# Patient Record
Sex: FEMALE | Race: WHITE | NOT HISPANIC OR LATINO | Employment: OTHER | URBAN - METROPOLITAN AREA
[De-identification: names, ages, dates, MRNs, and addresses within clinical notes are randomized per-mention and may not be internally consistent; named-entity substitution may affect disease eponyms.]

---

## 2023-07-10 ENCOUNTER — OFFICE VISIT (OUTPATIENT)
Dept: PRIMARY CARE | Facility: CLINIC | Age: 73
End: 2023-07-10
Payer: MEDICARE

## 2023-07-10 VITALS
HEIGHT: 64 IN | SYSTOLIC BLOOD PRESSURE: 110 MMHG | WEIGHT: 150 LBS | RESPIRATION RATE: 12 BRPM | HEART RATE: 72 BPM | BODY MASS INDEX: 25.61 KG/M2 | DIASTOLIC BLOOD PRESSURE: 66 MMHG

## 2023-07-10 DIAGNOSIS — Z00.00 ROUTINE MEDICAL EXAM: ICD-10-CM

## 2023-07-10 DIAGNOSIS — M62.830 SPASM OF MUSCLE OF LOWER BACK: Primary | ICD-10-CM

## 2023-07-10 PROCEDURE — 1170F FXNL STATUS ASSESSED: CPT | Performed by: FAMILY MEDICINE

## 2023-07-10 PROCEDURE — 1159F MED LIST DOCD IN RCRD: CPT | Performed by: FAMILY MEDICINE

## 2023-07-10 PROCEDURE — 1036F TOBACCO NON-USER: CPT | Performed by: FAMILY MEDICINE

## 2023-07-10 PROCEDURE — 1160F RVW MEDS BY RX/DR IN RCRD: CPT | Performed by: FAMILY MEDICINE

## 2023-07-10 PROCEDURE — 99213 OFFICE O/P EST LOW 20 MIN: CPT | Performed by: FAMILY MEDICINE

## 2023-07-10 RX ORDER — CYCLOBENZAPRINE HCL 10 MG
10 TABLET ORAL NIGHTLY PRN
Qty: 30 TABLET | Refills: 0 | Status: SHIPPED | OUTPATIENT
Start: 2023-07-10 | End: 2023-07-28

## 2023-07-10 ASSESSMENT — ACTIVITIES OF DAILY LIVING (ADL)
DOING_HOUSEWORK: INDEPENDENT
MANAGING_FINANCES: INDEPENDENT
TAKING_MEDICATION: INDEPENDENT
DRESSING: INDEPENDENT
GROCERY_SHOPPING: INDEPENDENT
BATHING: INDEPENDENT

## 2023-07-10 ASSESSMENT — PATIENT HEALTH QUESTIONNAIRE - PHQ9
2. FEELING DOWN, DEPRESSED OR HOPELESS: NOT AT ALL
SUM OF ALL RESPONSES TO PHQ9 QUESTIONS 1 AND 2: 0
1. LITTLE INTEREST OR PLEASURE IN DOING THINGS: NOT AT ALL

## 2023-07-10 NOTE — PROGRESS NOTES
"Subjective   Patient ID: Kaitlyn Eddy is a 73 y.o. female who presents for Medicare Annual Wellness Visit Subsequent (Left flank pain for 4 days).    HPI   pt noticed low flank tenderness for 4 days. No prior injury, no dysuria, urinary frequency and urgency, hematuria, flank pain, fever or chills. No nausea, vomiting. No cp, sob, HA. Normal appetite. The pain was localized to left flank. Symptoms persisted today    Review of Systems    Objective   /66   Pulse 72   Resp 12   Ht 1.626 m (5' 4\")   Wt 68 kg (150 lb)   BMI 25.75 kg/m²     Physical Exam  NAD, well groomed, No sclera icterus. Moist mouth mucosa,  lungs: CTA b/l, heart: RRR, abd: soft, there was  no suprapubic tenderness, no rebound or guarding, BS+, no CVA percussion tenderness. Mild left low back muscle spasm, no local SWELLING OR RASHES OR ERYTHEMA, ,  good balance. A&Ox3, good judgment. No depressed mood    Assessment/Plan   Problem List Items Addressed This Visit       Spasm of muscle of lower back - Primary     Flexeril as dir. Cold compress. Check UA. Call office if symptoms do not resolve in 3 days           Relevant Medications    cyclobenzaprine (Flexeril) 10 mg tablet    Other Relevant Orders    Urinalysis with Reflex Microscopic    Routine medical exam    Relevant Orders    Lipid Panel    Comprehensive Metabolic Panel     Medicare wellness visit: pt was capable of performing all his ADLs and IADLs. Pt has good memory and cognitive function.  Recommend healthy diet and regular exercise. Advise eye exam by an OD yearly for glaucoma screen and dental exam every 6 months. check lipids.   will monitor blood pressure, cholesterol levels and weight regularly. Recommend sunscreen application if exposed to the sun when the UV index is over 2. Recommend shingle vaccines, hep B vaccine, tetanus vaccine, pcv20 and covid shot.   recommend cologuard or Colonoscopy, DEXA, mammogram, HIV, hep C tests.  pt declined    Recommend pt to clear " throw rugs at home and keep good lighting at night to avoid falls. Keep hot water for shower below 120F to avoid burn injury.  recommend grab bar at bathtub.   recommend to update living will and DPOA

## 2023-07-11 ENCOUNTER — TELEPHONE (OUTPATIENT)
Dept: PRIMARY CARE | Facility: CLINIC | Age: 73
End: 2023-07-11

## 2023-07-11 ENCOUNTER — LAB (OUTPATIENT)
Dept: LAB | Facility: LAB | Age: 73
End: 2023-07-11
Payer: MEDICARE

## 2023-07-11 DIAGNOSIS — Z00.00 ROUTINE MEDICAL EXAM: ICD-10-CM

## 2023-07-11 DIAGNOSIS — M62.830 SPASM OF MUSCLE OF LOWER BACK: ICD-10-CM

## 2023-07-11 DIAGNOSIS — M62.830 SPASM OF MUSCLE OF LOWER BACK: Primary | ICD-10-CM

## 2023-07-11 LAB
APPEARANCE, URINE: CLEAR
BILIRUBIN, URINE: NEGATIVE
BLOOD, URINE: NEGATIVE
COLOR, URINE: NORMAL
GLUCOSE, URINE: NEGATIVE MG/DL
KETONES, URINE: NEGATIVE MG/DL
LEUKOCYTE ESTERASE, URINE: NEGATIVE
NITRITE, URINE: NEGATIVE
PH, URINE: 6 (ref 5–8)
PROTEIN, URINE: NEGATIVE MG/DL
SPECIFIC GRAVITY, URINE: 1.01 (ref 1–1.03)
UROBILINOGEN, URINE: <2 MG/DL (ref 0–1.9)

## 2023-07-11 PROCEDURE — 81003 URINALYSIS AUTO W/O SCOPE: CPT

## 2023-07-11 PROCEDURE — 80053 COMPREHEN METABOLIC PANEL: CPT

## 2023-07-11 PROCEDURE — 36415 COLL VENOUS BLD VENIPUNCTURE: CPT

## 2023-07-11 PROCEDURE — 80061 LIPID PANEL: CPT

## 2023-07-11 RX ORDER — MELOXICAM 15 MG/1
15 TABLET ORAL DAILY
Qty: 30 TABLET | Refills: 0 | Status: SHIPPED | OUTPATIENT
Start: 2023-07-11 | End: 2024-05-29 | Stop reason: ALTCHOICE

## 2023-07-11 NOTE — TELEPHONE ENCOUNTER
Patient was seen yesterday and even after medication given the pain is increasing. She states she cannot lay down as the pain is too intense. Pacing seems to be when she has the most relief. Can you call her at [phone]

## 2023-07-12 LAB
ALANINE AMINOTRANSFERASE (SGPT) (U/L) IN SER/PLAS: 46 U/L (ref 7–45)
ALBUMIN (G/DL) IN SER/PLAS: 4.4 G/DL (ref 3.4–5)
ALKALINE PHOSPHATASE (U/L) IN SER/PLAS: 118 U/L (ref 33–136)
ANION GAP IN SER/PLAS: 12 MMOL/L (ref 10–20)
ASPARTATE AMINOTRANSFERASE (SGOT) (U/L) IN SER/PLAS: 27 U/L (ref 9–39)
BILIRUBIN TOTAL (MG/DL) IN SER/PLAS: 0.5 MG/DL (ref 0–1.2)
CALCIUM (MG/DL) IN SER/PLAS: 9.8 MG/DL (ref 8.6–10.6)
CARBON DIOXIDE, TOTAL (MMOL/L) IN SER/PLAS: 31 MMOL/L (ref 21–32)
CHLORIDE (MMOL/L) IN SER/PLAS: 103 MMOL/L (ref 98–107)
CHOLESTEROL (MG/DL) IN SER/PLAS: 200 MG/DL (ref 0–199)
CHOLESTEROL IN HDL (MG/DL) IN SER/PLAS: 59.5 MG/DL
CHOLESTEROL/HDL RATIO: 3.4
CREATININE (MG/DL) IN SER/PLAS: 0.74 MG/DL (ref 0.5–1.05)
GFR FEMALE: 86 ML/MIN/1.73M2
GLUCOSE (MG/DL) IN SER/PLAS: 113 MG/DL (ref 74–99)
LDL: 130 MG/DL (ref 0–99)
POTASSIUM (MMOL/L) IN SER/PLAS: 4.6 MMOL/L (ref 3.5–5.3)
PROTEIN TOTAL: 7.2 G/DL (ref 6.4–8.2)
SODIUM (MMOL/L) IN SER/PLAS: 141 MMOL/L (ref 136–145)
TRIGLYCERIDE (MG/DL) IN SER/PLAS: 52 MG/DL (ref 0–149)
UREA NITROGEN (MG/DL) IN SER/PLAS: 15 MG/DL (ref 6–23)
VLDL: 10 MG/DL (ref 0–40)

## 2023-07-14 ENCOUNTER — TELEPHONE (OUTPATIENT)
Dept: PRIMARY CARE | Facility: CLINIC | Age: 73
End: 2023-07-14
Payer: MEDICARE

## 2023-07-14 NOTE — TELEPHONE ENCOUNTER
I called her to put her on the schedule today and she stated that maybe she should wait and start taking the sterids first and I put her on for 7/18 at 2:45 pm and she will call to let us know for sure is she is coming in and do you want her to a lipid panel if so please put in order

## 2023-07-14 NOTE — TELEPHONE ENCOUNTER
Patient was seen in the ER past two days and they called in steroids for her. They have done Ct with and without contrast and have found nothing. They told her to follow up with you today. Can we fit her in before you leave? Thank you

## 2023-07-18 ENCOUNTER — APPOINTMENT (OUTPATIENT)
Dept: PRIMARY CARE | Facility: CLINIC | Age: 73
End: 2023-07-18
Payer: MEDICARE

## 2023-07-21 ENCOUNTER — OFFICE VISIT (OUTPATIENT)
Dept: PRIMARY CARE | Facility: CLINIC | Age: 73
End: 2023-07-21
Payer: MEDICARE

## 2023-07-21 VITALS — DIASTOLIC BLOOD PRESSURE: 68 MMHG | SYSTOLIC BLOOD PRESSURE: 110 MMHG

## 2023-07-21 DIAGNOSIS — M79.18 MYOFASCIAL PAIN: Primary | ICD-10-CM

## 2023-07-21 PROCEDURE — 1160F RVW MEDS BY RX/DR IN RCRD: CPT | Performed by: FAMILY MEDICINE

## 2023-07-21 PROCEDURE — 1036F TOBACCO NON-USER: CPT | Performed by: FAMILY MEDICINE

## 2023-07-21 PROCEDURE — 99213 OFFICE O/P EST LOW 20 MIN: CPT | Performed by: FAMILY MEDICINE

## 2023-07-21 PROCEDURE — 1159F MED LIST DOCD IN RCRD: CPT | Performed by: FAMILY MEDICINE

## 2023-07-21 PROCEDURE — 20553 NJX 1/MLT TRIGGER POINTS 3/>: CPT | Performed by: FAMILY MEDICINE

## 2023-07-21 NOTE — PROGRESS NOTES
Subjective   Patient ID: Kaitlyn Eddy is a 73 y.o. female who presents for back pain    HPI   Patient noticed persistent  muscle spasms at low back. turning trunk from side to side made the pain worse. Flexeril and mobic and stretch exercise did not help. no recent heavy lifting or falls. pt controlled BM and bladder well. no ext weakness or numbness.    Review of Systems    Objective   /68     Physical Exam  Musculoskeletal:        Arms:    No distress, well groomed, eyes: Normal pupil size,  neck is supple, lungs: CTA b/l, no rales, heart: RRR,  abd: soft, no tenderness, BS+, moderate localized  tenderness at paraspinal muscles as marked in the picture, no local swelling or erythema. turning trunk from side to side aggravated the pain. normal strength and sensation at ext. normal gait and station. Good mood.     Assessment/Plan     Myofascial pain at  low  back, recommend trigger point injection of the area of pain. pt understood potential worse pain, bleeding and infection. pt agrees to have  the injection. Under sterile condition, the areas of pain as marked in the picture was each  injected with 1.5cc  1% lidocaine (NDC: 3196-8010-91) in a pepper spray fashion.  total volume of lidocaine used was 4.5 ml. pt felt pain relief after the injection. Recommend warm compress. Advise stretch exercise for pain relief.

## 2023-07-28 ENCOUNTER — OFFICE VISIT (OUTPATIENT)
Dept: PRIMARY CARE | Facility: CLINIC | Age: 73
End: 2023-07-28
Payer: MEDICARE

## 2023-07-28 VITALS — HEART RATE: 78 BPM | SYSTOLIC BLOOD PRESSURE: 132 MMHG | DIASTOLIC BLOOD PRESSURE: 77 MMHG

## 2023-07-28 DIAGNOSIS — K59.00 CONSTIPATION, UNSPECIFIED CONSTIPATION TYPE: Primary | ICD-10-CM

## 2023-07-28 DIAGNOSIS — M54.50 CHRONIC LOW BACK PAIN WITHOUT SCIATICA, UNSPECIFIED BACK PAIN LATERALITY: ICD-10-CM

## 2023-07-28 DIAGNOSIS — Z12.11 COLON CANCER SCREENING: ICD-10-CM

## 2023-07-28 DIAGNOSIS — G89.29 CHRONIC LOW BACK PAIN WITHOUT SCIATICA, UNSPECIFIED BACK PAIN LATERALITY: ICD-10-CM

## 2023-07-28 DIAGNOSIS — E87.1 HYPONATREMIA: ICD-10-CM

## 2023-07-28 PROCEDURE — 99214 OFFICE O/P EST MOD 30 MIN: CPT | Performed by: FAMILY MEDICINE

## 2023-07-28 PROCEDURE — 1160F RVW MEDS BY RX/DR IN RCRD: CPT | Performed by: FAMILY MEDICINE

## 2023-07-28 PROCEDURE — 1036F TOBACCO NON-USER: CPT | Performed by: FAMILY MEDICINE

## 2023-07-28 PROCEDURE — 1159F MED LIST DOCD IN RCRD: CPT | Performed by: FAMILY MEDICINE

## 2023-07-28 RX ORDER — DICLOFENAC SODIUM 75 MG/1
75 TABLET, DELAYED RELEASE ORAL 2 TIMES DAILY PRN
Qty: 60 TABLET | Refills: 0 | Status: SHIPPED | OUTPATIENT
Start: 2023-07-28 | End: 2023-09-26

## 2023-07-28 RX ORDER — POLYETHYLENE GLYCOL 3350 17 G/17G
17 POWDER, FOR SOLUTION ORAL DAILY
Qty: 30 PACKET | Refills: 3 | Status: SHIPPED | OUTPATIENT
Start: 2023-07-28 | End: 2024-05-29 | Stop reason: ALTCHOICE

## 2023-07-28 NOTE — ASSESSMENT & PLAN NOTE
Constipation, not controlled. Start MiraLAX.  Increase fluid and fiber such as fruits and vegetables intake. Increase exercise.

## 2023-07-28 NOTE — PROGRESS NOTES
Subjective   Patient ID: Kaitlyn Eddy is a 73 y.o. female who presents for low back pain    HPI   Chronic low back dull pain for over 1 mos persisted. Flexeril did not help. The pain was localized to low back. pt denies LE weakness or numbness. pt controlled BM and bladder well. pt was unable to fall asleep due to the pain. no imbalance or falls. Constipation persisted. No confusion, nausea or vomiting. Pt had  appetite.     Review of Systems    Objective   /77   Pulse 78     Physical Exam  Mild  distress, well groomed, No sclera icterus. normal respiration, lungs: CTA b/l, heart: RRR, no LE  edema, normal pedal pulses, abd: soft, no tenderness, BS+, mild low back tenderness, DTR were normal at the knees, SLR test was negative, normal strength and sensation at low extremities, good balance, No depressed mood.    Assessment/Plan   Problem List Items Addressed This Visit       Constipation - Primary     Constipation, not controlled. Start MiraLAX.  Increase fluid and fiber such as fruits and vegetables intake. Increase exercise.            Relevant Medications    polyethylene glycol (Glycolax, Miralax) 17 gram packet    Chronic low back pain without sciatica     Check lumbar x-rays.  stretch exercise.  start diclofenac  as dir. caution of SE from diclofenac such as kidney damage and stomach bleeding. take diclofenac with foods. call office if symptoms do not improve in 1 week or if pain gets worse.           Relevant Medications    diclofenac (Voltaren) 75 mg EC tablet    Other Relevant Orders    XR lumbar spine complete 4+ views    Hyponatremia     Limit fluid intake to 50 oz a day

## 2023-07-28 NOTE — ASSESSMENT & PLAN NOTE
Check lumbar x-rays.  stretch exercise.  start diclofenac  as dir. caution of SE from diclofenac such as kidney damage and stomach bleeding. take diclofenac with foods. call office if symptoms do not improve in 1 week or if pain gets worse.

## 2023-08-02 ENCOUNTER — TELEPHONE (OUTPATIENT)
Dept: PRIMARY CARE | Facility: CLINIC | Age: 73
End: 2023-08-02
Payer: MEDICARE

## 2023-08-02 DIAGNOSIS — G89.29 CHRONIC LOW BACK PAIN WITHOUT SCIATICA, UNSPECIFIED BACK PAIN LATERALITY: Primary | ICD-10-CM

## 2023-08-02 DIAGNOSIS — M54.50 CHRONIC LOW BACK PAIN WITHOUT SCIATICA, UNSPECIFIED BACK PAIN LATERALITY: Primary | ICD-10-CM

## 2023-08-02 NOTE — TELEPHONE ENCOUNTER
Patient is still unable to sleep at night due to her back pain. She would like a referral to rheumatology. Can we put this in for her? Thank you

## 2023-08-03 LAB
ANTI-SSA: <0.2 AI
ANTI-SSB: <0.2 AI

## 2023-08-08 ENCOUNTER — TELEPHONE (OUTPATIENT)
Dept: PRIMARY CARE | Facility: CLINIC | Age: 73
End: 2023-08-08

## 2023-08-08 ENCOUNTER — OFFICE VISIT (OUTPATIENT)
Dept: PRIMARY CARE | Facility: CLINIC | Age: 73
End: 2023-08-08
Payer: MEDICARE

## 2023-08-08 VITALS — DIASTOLIC BLOOD PRESSURE: 76 MMHG | SYSTOLIC BLOOD PRESSURE: 125 MMHG | RESPIRATION RATE: 14 BRPM | HEART RATE: 68 BPM

## 2023-08-08 DIAGNOSIS — G51.0 BELL'S PALSY: Primary | ICD-10-CM

## 2023-08-08 DIAGNOSIS — G89.29 CHRONIC MIDLINE LOW BACK PAIN WITHOUT SCIATICA: ICD-10-CM

## 2023-08-08 DIAGNOSIS — M54.50 CHRONIC MIDLINE LOW BACK PAIN WITHOUT SCIATICA: ICD-10-CM

## 2023-08-08 DIAGNOSIS — M35.00 SICCA SYNDROME (MULTI): ICD-10-CM

## 2023-08-08 PROCEDURE — 1160F RVW MEDS BY RX/DR IN RCRD: CPT | Performed by: FAMILY MEDICINE

## 2023-08-08 PROCEDURE — 99214 OFFICE O/P EST MOD 30 MIN: CPT | Performed by: FAMILY MEDICINE

## 2023-08-08 PROCEDURE — 1036F TOBACCO NON-USER: CPT | Performed by: FAMILY MEDICINE

## 2023-08-08 PROCEDURE — 1159F MED LIST DOCD IN RCRD: CPT | Performed by: FAMILY MEDICINE

## 2023-08-08 RX ORDER — GABAPENTIN 300 MG/1
300 CAPSULE ORAL NIGHTLY
Qty: 30 CAPSULE | Refills: 0 | Status: SHIPPED | OUTPATIENT
Start: 2023-08-08 | End: 2024-05-29 | Stop reason: ALTCHOICE

## 2023-08-08 RX ORDER — PREDNISONE 20 MG/1
TABLET ORAL
Qty: 23 TABLET | Refills: 0 | Status: SHIPPED | OUTPATIENT
Start: 2023-08-08 | End: 2024-05-29 | Stop reason: ALTCHOICE

## 2023-08-08 RX ORDER — VALACYCLOVIR HYDROCHLORIDE 1 G/1
1000 TABLET, FILM COATED ORAL 3 TIMES DAILY
Qty: 21 TABLET | Refills: 0 | Status: SHIPPED | OUTPATIENT
Start: 2023-08-08 | End: 2023-08-15

## 2023-08-09 NOTE — ASSESSMENT & PLAN NOTE
Prednisone and valtrex as dir. Tape the the affected eye if it is not closed while asleep. Pt was concerned that she may have lyme disease. Pt prefers to see an ID for further eval. Call office if symptoms get worse, or if ext. Weakness, incontinence, trouble swallowing, severe HA occurs

## 2023-08-09 NOTE — PROGRESS NOTES
Subjective   Patient ID: Kaitlyn Eddy is a 73 y.o. female who presents for left face paralysis    HPI   Pt noticed sudden weakness and  paralysis on left  side of her face With A drooping eyebrow and mouth and Difficulty closing left eyelid 2 days ago. Pt had slight  eating or drinking. No ext weakness. Stable urination and bm. Pt cont to have dry mouth and dry eyes. Pt had hx of sicca syndrome, pt had blood tests with rheumatologist. low back dull pain persisted  at 4-7/10. pt denies LE weakness or numbness. no imbalance or falls.     Review of Systems    Objective   /76   Pulse 68   Resp 14     Physical Exam  Mild  distress, well groomed, No sclera icterus. PERRLA, ENT were normal. Normal hearing. No cervical or axillary lymphadenopathy. normal respiration, lungs: CTA b/l, heart: RRR, no LE  edema, normal pedal pulses, abd: soft, no tenderness, BS+, mild low back tenderness, DTR were normal at the knees, SLR test was negative, normal strength and sensation at low extremities, good balance, + left upper and lower facial muscle weakness on the face (including the forehead, eyelid, and mouth). Left face drooping.  Good mood  Assessment/Plan   Problem List Items Addressed This Visit       Chronic low back pain without sciatica     Not controlled. Cont mobic and add neurontin. Ortho eval         Relevant Medications    gabapentin (Neurontin) 300 mg capsule    Other Relevant Orders    Referral to Orthopaedic Surgery    Sicca syndrome (CMS/HCC)     Persistent dry mouth and dry eyes. Await blood test results. Fu with rheumatologist         Bell's palsy - Primary     Prednisone and valtrex as dir. Tape the the affected eye if it is not closed while asleep. Pt was concerned that she may have lyme disease. Pt prefers to see an ID for further eval. Call office if symptoms get worse, or if ext. Weakness, incontinence, trouble swallowing, severe HA occurs           Relevant Medications    valACYclovir (Valtrex) 1  gram tablet    predniSONE (Deltasone) 20 mg tablet    Other Relevant Orders    Referral to Infectious Disease

## 2023-08-10 ENCOUNTER — APPOINTMENT (OUTPATIENT)
Dept: LAB | Facility: LAB | Age: 73
End: 2023-08-10
Payer: MEDICARE

## 2023-08-10 DIAGNOSIS — R19.5 POSITIVE COLORECTAL CANCER SCREENING USING COLOGUARD TEST: Primary | ICD-10-CM

## 2023-08-10 LAB
C REACTIVE PROTEIN (MG/L) IN SER/PLAS: 0.14 MG/DL
CYTOMEGALOVIRUS IGG ANTIBODY: REACTIVE
EBV INTERPRETATION: ABNORMAL
EPSTEIN-BARR VCA IGG: POSITIVE
EPSTEIN-BARR VCA IGM: POSITIVE
EPSTEIN-BARR VIRUS EARLY ANTIGEN ANTIBODY, IGG: POSITIVE
EPSTIEN-BARR NUCLEAR ANTIGEN AB: POSITIVE
HERPES SIMPLEX VIRUS 1 IGG: >8 INDEX
HERPES SIMPLEX VIRUS 2 IGG: <0.2 INDEX
NONINV COLON CA DNA+OCC BLD SCRN STL QL: POSITIVE
SEDIMENTATION RATE, ERYTHROCYTE: 34 MM/H (ref 0–30)
SYPHILIS TOTAL AB: NONREACTIVE

## 2023-08-11 LAB
ANA PATTERN: ABNORMAL
ANA TITER: ABNORMAL
ANTI-CENTROMERE: <0.2 AI
ANTI-CHROMATIN: <0.2 AI
ANTI-DNA (DS): <1 IU/ML
ANTI-JO-1 IGG: <0.2 AI
ANTI-NUCLEAR ANTIBODY (ANA): POSITIVE
ANTI-RIBOSOMAL P: <0.2 AI
ANTI-RNP: <0.2 AI
ANTI-SCL-70: <0.2 AI
ANTI-SM/RNP: <0.2 AI
ANTI-SM: <0.2 AI
ANTI-SSA: <0.2 AI
ANTI-SSB: <0.2 AI

## 2023-08-14 LAB
ANCA IFA PATTERN: NORMAL
ANCA IFA TITER: NORMAL
B. BURGDORFERI VLSE1/PEPC10 ABS, ELISA: 5.03 IV
BORRELIA BURGDORFERI AB, IGG BY ELISA: 3.92 IV
BORRELIA BURGDORFERI AB, IGM BY ELISA: 3.96 IV
CYTOMEGALOVIRUS IGM ANTIBODY: 8.9 AU/ML
LYME MODIFIED 2-TIER TESTING INTERPRETATION: POSITIVE
MYELOPEROXIDASE (MPO) AB, IGG: 0 AU/ML (ref 0–19)
ROCKY MOUNTAIN SPOTTED FEVER IGG ANTIBODY: NORMAL
ROCKY MOUNTAIN SPOTTED FEVER IGM ANTIBODY: NORMAL
SERINE PROTEINASE 3 (PR3) AB, IGG: 2 AU/ML (ref 0–19)

## 2023-08-15 ENCOUNTER — TELEPHONE (OUTPATIENT)
Dept: PRIMARY CARE | Facility: CLINIC | Age: 73
End: 2023-08-15
Payer: MEDICARE

## 2023-08-15 NOTE — TELEPHONE ENCOUNTER
Patient would like to talk with you regarding her results that she is looking at in her chart. Thank you

## 2023-10-26 ENCOUNTER — APPOINTMENT (OUTPATIENT)
Dept: INFECTIOUS DISEASES | Facility: CLINIC | Age: 73
End: 2023-10-26
Payer: MEDICARE

## 2024-05-29 ENCOUNTER — LAB (OUTPATIENT)
Dept: LAB | Facility: LAB | Age: 74
End: 2024-05-29
Payer: MEDICARE

## 2024-05-29 ENCOUNTER — OFFICE VISIT (OUTPATIENT)
Dept: PRIMARY CARE | Facility: CLINIC | Age: 74
End: 2024-05-29
Payer: MEDICARE

## 2024-05-29 VITALS
DIASTOLIC BLOOD PRESSURE: 76 MMHG | SYSTOLIC BLOOD PRESSURE: 129 MMHG | RESPIRATION RATE: 15 BRPM | HEART RATE: 68 BPM | BODY MASS INDEX: 24.75 KG/M2 | HEIGHT: 64 IN | WEIGHT: 145 LBS

## 2024-05-29 DIAGNOSIS — Z00.00 ROUTINE MEDICAL EXAM: ICD-10-CM

## 2024-05-29 DIAGNOSIS — Z00.00 ROUTINE MEDICAL EXAM: Primary | ICD-10-CM

## 2024-05-29 DIAGNOSIS — Z78.0 POST-MENOPAUSAL: ICD-10-CM

## 2024-05-29 DIAGNOSIS — Z12.31 ENCOUNTER FOR SCREENING MAMMOGRAM FOR MALIGNANT NEOPLASM OF BREAST: ICD-10-CM

## 2024-05-29 PROBLEM — G89.29 CHRONIC LOW BACK PAIN WITHOUT SCIATICA: Status: RESOLVED | Noted: 2023-07-28 | Resolved: 2024-05-29

## 2024-05-29 PROBLEM — M79.18 MYOFASCIAL PAIN: Status: RESOLVED | Noted: 2023-07-21 | Resolved: 2024-05-29

## 2024-05-29 PROBLEM — M62.830 SPASM OF MUSCLE OF LOWER BACK: Status: RESOLVED | Noted: 2023-07-10 | Resolved: 2024-05-29

## 2024-05-29 PROBLEM — M35.00 SICCA SYNDROME (MULTI): Status: RESOLVED | Noted: 2023-08-08 | Resolved: 2024-05-29

## 2024-05-29 PROBLEM — M54.50 CHRONIC LOW BACK PAIN WITHOUT SCIATICA: Status: RESOLVED | Noted: 2023-07-28 | Resolved: 2024-05-29

## 2024-05-29 PROBLEM — K59.00 CONSTIPATION: Status: RESOLVED | Noted: 2023-07-28 | Resolved: 2024-05-29

## 2024-05-29 PROBLEM — G51.0 BELL'S PALSY: Status: RESOLVED | Noted: 2023-08-08 | Resolved: 2024-05-29

## 2024-05-29 LAB
ALBUMIN SERPL BCP-MCNC: 4.5 G/DL (ref 3.4–5)
ALP SERPL-CCNC: 82 U/L (ref 33–136)
ALT SERPL W P-5'-P-CCNC: 28 U/L (ref 7–45)
ANION GAP SERPL CALC-SCNC: 13 MMOL/L (ref 10–20)
AST SERPL W P-5'-P-CCNC: 21 U/L (ref 9–39)
BILIRUB SERPL-MCNC: 0.6 MG/DL (ref 0–1.2)
BUN SERPL-MCNC: 28 MG/DL (ref 6–23)
CALCIUM SERPL-MCNC: 9.9 MG/DL (ref 8.6–10.3)
CHLORIDE SERPL-SCNC: 104 MMOL/L (ref 98–107)
CHOLEST SERPL-MCNC: 222 MG/DL (ref 0–199)
CHOLESTEROL/HDL RATIO: 3.6
CO2 SERPL-SCNC: 29 MMOL/L (ref 21–32)
CREAT SERPL-MCNC: 0.66 MG/DL (ref 0.5–1.05)
EGFRCR SERPLBLD CKD-EPI 2021: >90 ML/MIN/1.73M*2
ERYTHROCYTE [DISTWIDTH] IN BLOOD BY AUTOMATED COUNT: 13.6 % (ref 11.5–14.5)
GLUCOSE SERPL-MCNC: 102 MG/DL (ref 74–99)
HCT VFR BLD AUTO: 44.1 % (ref 36–46)
HDLC SERPL-MCNC: 61.3 MG/DL
HGB BLD-MCNC: 13.3 G/DL (ref 12–16)
LDLC SERPL CALC-MCNC: 148 MG/DL
MCH RBC QN AUTO: 26.4 PG (ref 26–34)
MCHC RBC AUTO-ENTMCNC: 30.2 G/DL (ref 32–36)
MCV RBC AUTO: 88 FL (ref 80–100)
NON HDL CHOLESTEROL: 161 MG/DL (ref 0–149)
NRBC BLD-RTO: 0 /100 WBCS (ref 0–0)
PLATELET # BLD AUTO: 267 X10*3/UL (ref 150–450)
POTASSIUM SERPL-SCNC: 4.9 MMOL/L (ref 3.5–5.3)
PROT SERPL-MCNC: 7.2 G/DL (ref 6.4–8.2)
RBC # BLD AUTO: 5.03 X10*6/UL (ref 4–5.2)
SODIUM SERPL-SCNC: 141 MMOL/L (ref 136–145)
TRIGL SERPL-MCNC: 66 MG/DL (ref 0–149)
TSH SERPL-ACNC: 1.93 MIU/L (ref 0.44–3.98)
VLDL: 13 MG/DL (ref 0–40)
WBC # BLD AUTO: 6.4 X10*3/UL (ref 4.4–11.3)

## 2024-05-29 PROCEDURE — 1036F TOBACCO NON-USER: CPT | Performed by: FAMILY MEDICINE

## 2024-05-29 PROCEDURE — G0439 PPPS, SUBSEQ VISIT: HCPCS | Performed by: FAMILY MEDICINE

## 2024-05-29 PROCEDURE — 1160F RVW MEDS BY RX/DR IN RCRD: CPT | Performed by: FAMILY MEDICINE

## 2024-05-29 PROCEDURE — 36415 COLL VENOUS BLD VENIPUNCTURE: CPT

## 2024-05-29 PROCEDURE — 1124F ACP DISCUSS-NO DSCNMKR DOCD: CPT | Performed by: FAMILY MEDICINE

## 2024-05-29 PROCEDURE — 1159F MED LIST DOCD IN RCRD: CPT | Performed by: FAMILY MEDICINE

## 2024-05-29 PROCEDURE — 99397 PER PM REEVAL EST PAT 65+ YR: CPT | Performed by: FAMILY MEDICINE

## 2024-05-29 PROCEDURE — 1170F FXNL STATUS ASSESSED: CPT | Performed by: FAMILY MEDICINE

## 2024-05-29 PROCEDURE — 80053 COMPREHEN METABOLIC PANEL: CPT

## 2024-05-29 PROCEDURE — 80061 LIPID PANEL: CPT

## 2024-05-29 PROCEDURE — 85027 COMPLETE CBC AUTOMATED: CPT

## 2024-05-29 PROCEDURE — 84443 ASSAY THYROID STIM HORMONE: CPT

## 2024-05-29 ASSESSMENT — ACTIVITIES OF DAILY LIVING (ADL)
BATHING: INDEPENDENT
TAKING_MEDICATION: INDEPENDENT
DOING_HOUSEWORK: INDEPENDENT
MANAGING_FINANCES: INDEPENDENT
GROCERY_SHOPPING: INDEPENDENT
DRESSING: INDEPENDENT

## 2024-05-29 NOTE — PROGRESS NOTES
Subjective   Patient ID: Kaitlyn Eddy is a 74 y.o. female who presents for Medicare Annual Wellness Visit Subsequent (Cpe/).    HPI   No concerns, no dry eyes or dry mouth. No pain or imbalance. pt has regular dental visits. no vision problems. no hearing loss.   Lifestyle: healthy diet. no weight concerns. Pt exercises regularly.   no tobacco or alcohol abuse.   Safety elements used: seat belt, safe driving habits and smoke detector.   no passive smoke exposure, chemical abuse, domestic violence, anxiety symptoms, depression symptoms, pt has safe sexual behavior and safe driving habits, no driving violations, no history of DUI. no tuberculosis exposure.   The patient is postmenopausal. No vaginal bleeding. no menopausal symptoms. she is sexually active. no bladder concerns.  no history of an abnormal pap smear.   Review of Systems  Constitutional: no chills, no fever and no night sweats.   Eyes: no blurred vision and no eyesight problems.   ENT: no hearing loss, no nasal congestion, no nasal discharge, no hoarseness and no sore throat.   Neck: no mass(es) and no swelling.   Cardiovascular: no chest pain, no intermittent leg claudication, no lower extremity edema, no palpitations and no syncope.   Respiratory: no cough, no shortness of breath during exertion, no shortness of breath at rest and no wheezing.   Gastrointestinal: no abdominal pain, no blood in stools, no constipation, no diarrhea, no melena, no nausea, no rectal pain and no vomiting.   Genitourinary: no unexplained vaginal bleeding, no dysuria, no change in urinary frequency, no genital lesions, no hematuria, no urinary hesitancy, no incontinence, no pelvic pain, no sexual dysfunction, no feelings of urinary urgency and no vaginal discharge.   Musculoskeletal: no arthralgias, no back pain, no localized joint pain, no myalgias and no neck pain.   Integumentary: no new skin lesions, no rashes and no skin wound.   Neurological: no confusion, no  "convulsions, no difficulty walking, no dizziness, no headache, no limb weakness, no memory changes, no numbness, no speech difficulties and no tingling.   Psychiatric: no anxiety, no personality change, no depression, no anhedonia, no sleep disturbances and no substance use disorders.   Endocrine: no changes in appetite, no deepening of the voice, no polyuria, no feelings of weakness, no recent weight gain and no recent weight loss.   Hematologic/Lymphatic: no tendency for easy bleeding, no tendency for easy bruising, no recurrent infections and no swollen glands.     Objective   /76   Pulse 68   Resp 15   Ht 1.626 m (5' 4\")   Wt 65.8 kg (145 lb)   BMI 24.89 kg/m²     Physical Exam    Constitutional: Alert and in no acute distress. Well developed, well nourished.   Head and Face: Head and face: Normal.  Palpation of the face and sinuses: Normal.    Eyes: Normal external exam. Pupils: PERRLA,  EOMI.    Ears, Nose, Mouth, and Throat: External inspection of ears and nose: Normal.  Hearing: Normal.  Nasal mucosa, septum, and turbinates: Normal.  Oropharynx: Normal.    Neck: No neck mass was observed. Supple. Thyroid not enlarged and there were no palpable thyroid nodules.   Cardiovascular: Heart rate and rhythm were normal, normal S1 and S2, no gallops, no murmurs and no pericardial rub. Pedal pulses: Normal. No peripheral edema.   Pulmonary: No respiratory distress. Clear bilateral breath sounds.   Chest wall: Normal.    Abdomen: Soft nontender; no abdominal mass palpated. No organomegaly. No hernias.   Musculoskeletal: Gait and station: Normal. No joint swelling seen, normal movements of all extremities. Range of motion: Normal.  Muscle strength/tone: Normal.    Skin: Normal skin color and pigmentation, normal skin turgor, and no rash. Palpation of skin and subcutaneous tissue: Normal.    Neurologic: Cranial nerves 2-12 grossly intact. Deep tendon reflexes were 2+ and symmetric. Sensation: Normal. " Coordination: Normal.    Psychiatric: Judgment and insight: Intact. Alert and oriented x 3. Recent and remote memory: Normal.  Mood and affect: Normal.   Lymphatic: No cervical lymphadenopathy. Palpation of lymph nodes in axillae: Normal.      Assessment/Plan     Medicare wellness and cpe visit: unremarkable cpe. pt was capable of performing all his ADLs and IADLs. Pt has good memory and cognitive function.  Recommend healthy diet and regular exercise. Advise eye exam by an OD yearly for glaucoma screen and dental exam every 6 months. check lipids.   will monitor blood pressure, cholesterol levels and weight regularly. Recommend sunscreen application if exposed to the sun when the UV index is over 2. Recommend shingle vaccines, tetanus vaccine, pcv20, rsv, and covid shot.   recommend  Colonoscopy,   hep C tests.  pt declined    Recommend pt to clear throw rugs at home and keep good lighting at night to avoid falls. Keep hot water for shower below 120F to avoid burn injury.  recommend grab bar at bathtub.   recommend to update living will and DPOA